# Patient Record
Sex: FEMALE | Race: WHITE | Employment: FULL TIME | URBAN - METROPOLITAN AREA
[De-identification: names, ages, dates, MRNs, and addresses within clinical notes are randomized per-mention and may not be internally consistent; named-entity substitution may affect disease eponyms.]

---

## 2018-03-25 ENCOUNTER — APPOINTMENT (OUTPATIENT)
Dept: GENERAL RADIOLOGY | Facility: CLINIC | Age: 53
End: 2018-03-25
Attending: EMERGENCY MEDICINE
Payer: COMMERCIAL

## 2018-03-25 ENCOUNTER — HOSPITAL ENCOUNTER (EMERGENCY)
Facility: CLINIC | Age: 53
Discharge: HOME | End: 2018-03-25
Attending: EMERGENCY MEDICINE | Admitting: EMERGENCY MEDICINE
Payer: COMMERCIAL

## 2018-03-25 VITALS
DIASTOLIC BLOOD PRESSURE: 71 MMHG | HEART RATE: 79 BPM | HEIGHT: 71 IN | SYSTOLIC BLOOD PRESSURE: 131 MMHG | RESPIRATION RATE: 16 BRPM | WEIGHT: 155 LBS | TEMPERATURE: 98.2 F | BODY MASS INDEX: 21.7 KG/M2 | OXYGEN SATURATION: 100 %

## 2018-03-25 DIAGNOSIS — S93.491A SPRAIN OF ANTERIOR TALOFIBULAR LIGAMENT OF RIGHT ANKLE, INITIAL ENCOUNTER: ICD-10-CM

## 2018-03-25 PROCEDURE — 99283 EMERGENCY DEPT VISIT LOW MDM: CPT

## 2018-03-25 PROCEDURE — 73630 X-RAY EXAM OF FOOT: CPT | Mod: RT

## 2018-03-25 PROCEDURE — 73610 X-RAY EXAM OF ANKLE: CPT | Mod: RT

## 2018-03-25 ASSESSMENT — ENCOUNTER SYMPTOMS: ARTHRALGIAS: 1

## 2018-03-25 NOTE — DISCHARGE INSTRUCTIONS
Treating Ankle Sprains  Treatment will depend on how bad your sprain is. For a severe sprain, healing may take 3 months or more.  Right after your injury: Use R.I.C.E.    Rest: At first, keep weight off the ankle as much as you can. You may be given crutches to help you walk without putting weight on the ankle.    Ice: Put an ice pack on the ankle for 15 minutes. Remove the pack and wait at least 30 minutes. Repeat for up to 3 days. This helps reduce swelling.    Compression: To reduce swelling and keep the joint stable, you may need to wrap the ankle with an elastic bandage. For more severe sprains, you may need an ankle brace or a cast.    Elevation: To reduce swelling, keep your ankle raised above your heart when you sit or lie down.  Medicine  Your healthcare provider may suggest oral non-steroidal anti-inflammatory medicine (NSAIDs), such as ibuprofen. This relieves the pain and helps reduce any swelling. Be sure to take your medicine as directed.  Contrast baths  After 3 days, soak your ankle in warm water for 30 seconds, then in cool water for 30 seconds. Go back and forth for 5 minutes. Doing this every 2 hours will help keep the swelling down.  Exercises    After about 2 to 3 weeks, you may be given exercises to strengthen the ligaments and muscles in the ankle. Doing these exercises will help prevent another ankle sprain. Exercises may include standing on your toes and then on your heels and doing ankle curls.  Ankle curls    Sit on the edge of a sturdy table or lie on your back.    Pull your toes toward you. Then point them away from you. Repeat for 2 to 3 minutes.   Date Last Reviewed: 9/28/2015 2000-2017 [employer]. [address] [place] [address], [place] [address]. All rights reserved. This information is not intended as a substitute for professional medical care. Always follow your healthcare professional's instructions.

## 2018-03-25 NOTE — ED AVS SNAPSHOT
Emergency Department    [address] [place] [address]    Phone:  [phone]    Fax:  [phone]                                       Felix Alvarado   MRN: [id number]    Department:   Emergency Department   Date of Visit:  3/25/2018           After Visit Summary Signature Page     I have received my discharge instructions, and my questions have been answered. I have discussed any challenges I see with this plan with the nurse or doctor.    ..........................................................................................................................................  Patient/Patient Representative Signature      ..........................................................................................................................................  Patient Representative Print Name and Relationship to Patient    ..................................................               ................................................  Date                                            Time    ..........................................................................................................................................  Reviewed by Signature/Title    ...................................................              ..............................................  Date                                                            Time

## 2018-03-25 NOTE — ED PROVIDER NOTES
"  History     Chief Complaint:  Ankle Pain    The history is provided by the patient.      Felix Alvarado is a 52 year old female who presents to the emergency department today for evaluation of right ankle pain. The patient reports she was walking down some stairs today when she slipped, and rolled her right ankle. Since, she's had 5/10 pain to the ankle extending into her right foot that increases with pressure on her ankle bone and when she moves her toes. She reports some mild tingling in her toes but no loss of sensation. The patient denies any other injuries.    Allergies:  Valium [Diazepam]        Medications:    The patient is currently on no regular medications.     Past Medical History:    Thyroid disease     Past Surgical History:    History reviewed. No pertinent surgical history.    Family History:    History reviewed. No pertinent family history.      Social History:  The patient was accompanied to the ED by  and daughter.  Smoking Status: Never smoker  Smokeless Tobacco: Negative  Alcohol Use: Positive    Review of Systems   Musculoskeletal: Positive for arthralgias (right ankle pain).   All other systems reviewed and are negative.    Physical Exam     Patient Vitals for the past 24 hrs:   BP Temp Temp src Pulse Resp SpO2 Height Weight   03/25/18 1356 131/71 98.2  F (36.8  C) Oral 79 16 100 % 1.803 m (5' 11\") 70.3 kg (155 lb)      Physical Exam  General: Patient in mild distress.  Alert and cooperative with exam. Normal mentation  HEENT: NC/AT. Conjunctiva without injection or scleral icterus. External ears normal.  Respiratory: Breathing comfortably on room air  CV: Normal rate, all extremities well perfused  GI:  Non-distended abdomen  Skin: Warm, dry, no rashes/open wounds on exposed skin  Musculoskeletal: RLE: CMS intact, tenderness to palpation over the distal fibula and over the ATF ligament with mild associated swelling and tenderness to palpation. Hip and knee exam normal. " Anterior/posterioir drawer test of ankle normal.  Neuro: Alert, answers questions appropriately. No gross motor deficits    Emergency Department Course     Imaging:  Radiology findings were communicated with the patient who voiced understanding of the findings.    Ankle XR, G/E 3 views, right   Final Result   IMPRESSION: No evidence of acute fracture or malalignment. The ankle   mortise is intact.      SABAS GOODWIN MD      Foot  XR, G/E 3 views, right   Final Result   IMPRESSION:  No acute osseous abnormality demonstrated.      HENRIQUE JENSEN MD            Emergency Department Course:    1352 The patient was sent for a XR while in the emergency department, results above.      1356 Nursing notes and vitals reviewed.    1432 I performed an exam of the patient as documented above.     1440 I personally reviewed the imaging results with the patient and answered all related questions prior to discharge.    Impression & Plan      Medical Decision Making:  Felix Alvarado is a 52 year old female who presents for evaluation of ankle pain. Signs and symptoms are consistent with an ankle sprain. This involves ligaments of the lateral ankle by clinical exam. No signs of septic arthritis, gout, pseudogout, fracture, cellulitis, etc. There are no signs of fracture. The patients neurovascular status is normal. A head to toe trauma exam is otherwise negative; the likelihood of other serious sequelae of trauma (spine, head, chest, abdomen, other extremities, pelvis) is low. Plan is for protected weightbearing, RICE treatment. Patient will advance weightbearing and follow-up with primary PRN in 10-14 days.     Diagnosis:    ICD-10-CM    1. Sprain of anterior talofibular ligament of right ankle, initial encounter S93.491A      Disposition:   Discharge     Scribe Disclosure:  David YANG, am serving as a scribe at 2:30 PM on 3/25/2018 to document services personally performed by Leander Tang DO based on my observations  and the provider's statements to me.      EMERGENCY DEPARTMENT       Leander Tang,   03/25/18 2050

## 2018-03-25 NOTE — ED AVS SNAPSHOT
Emergency Department    [address] [place] [address]    Phone:  [phone]    Fax:  [phone]                                       Felix Alvarado   MRN: [id number]    Department:   Emergency Department   Date of Visit:  3/25/2018           Patient Information     Date Of Birth          1965        Your diagnoses for this visit were:     Sprain of anterior talofibular ligament of right ankle, initial encounter        You were seen by Leander Tang DO.      Follow-up Information     Follow up with Primary care doctor In 10 days.    Why:  As needed        Discharge Instructions         Treating Ankle Sprains  Treatment will depend on how bad your sprain is. For a severe sprain, healing may take 3 months or more.  Right after your injury: Use R.I.C.E.    Rest: At first, keep weight off the ankle as much as you can. You may be given crutches to help you walk without putting weight on the ankle.    Ice: Put an ice pack on the ankle for 15 minutes. Remove the pack and wait at least 30 minutes. Repeat for up to 3 days. This helps reduce swelling.    Compression: To reduce swelling and keep the joint stable, you may need to wrap the ankle with an elastic bandage. For more severe sprains, you may need an ankle brace or a cast.    Elevation: To reduce swelling, keep your ankle raised above your heart when you sit or lie down.  Medicine  Your healthcare provider may suggest oral non-steroidal anti-inflammatory medicine (NSAIDs), such as ibuprofen. This relieves the pain and helps reduce any swelling. Be sure to take your medicine as directed.  Contrast baths  After 3 days, soak your ankle in warm water for 30 seconds, then in cool water for 30 seconds. Go back and forth for 5 minutes. Doing this every 2 hours will help keep the swelling down.  Exercises    After about 2 to 3 weeks, you may be given exercises to strengthen the ligaments and muscles in the ankle. Doing these  exercises will help prevent another ankle sprain. Exercises may include standing on your toes and then on your heels and doing ankle curls.  Ankle curls    Sit on the edge of a sturdy table or lie on your back.    Pull your toes toward you. Then point them away from you. Repeat for 2 to 3 minutes.   Date Last Reviewed: 9/28/2015 2000-2017 [employer]. [address] [place] [address], [place] [address]. All rights reserved. This information is not intended as a substitute for professional medical care. Always follow your healthcare professional's instructions.          24 Hour Appointment Hotline       To make an appointment at any [facility], call [phone] ([phone]). If you don't have a family doctor or clinic, we will help you find one. [place] clinics are conveniently located to serve the needs of you and your family.             Review of your medicines      Notice     You have not been prescribed any medications.            Procedures and tests performed during your visit     Ankle XR, G/E 3 views, right    Foot  XR, G/E 3 views, right      Orders Needing Specimen Collection     None      Pending Results     No orders found from 3/23/2018 to 3/26/2018.            Pending Culture Results     No orders found from 3/23/2018 to 3/26/2018.            Pending Results Instructions     If you had any lab results that were not finalized at the time of your Discharge, you can call the ED Lab Result RN at [phone]. You will be contacted by this team for any positive Lab results or changes in treatment. The nurses are available 7 days a week from 10A to 6:30P.  You can leave a message 24 hours per day and they will return your call.        Test Results From Your Hospital Stay        3/25/2018  2:13 PM      Narrative     FOOT THREE VIEWS RIGHT  3/25/2018 2:07 PM     HISTORY: Rolled ankle.     COMPARISON: None.    FINDINGS: There is no significant degenerative change. The Lisfranc  joint appears  unremarkable in these views. The plantar arch is  unremarkable in these views. There is no acute fracture or  dislocation. There are no worrisome bony lesions.         Impression     IMPRESSION:  No acute osseous abnormality demonstrated.    HENRIQUE JENSEN MD         3/25/2018  3:05 PM      Narrative     XR ANKLE RT G/E 3 VW 3/25/2018 2:57 PM    HISTORY: Pain.    COMPARISON: None.        Impression     IMPRESSION: No evidence of acute fracture or malalignment. The ankle  mortise is intact.    SABAS GOODWIN MD                Clinical Quality Measure: Blood Pressure Screening     Your blood pressure was checked while you were in the emergency department today. The last reading we obtained was  BP: 131/71 . Please read the guidelines below about what these numbers mean and what you should do about them.  If your systolic blood pressure (the top number) is less than 120 and your diastolic blood pressure (the bottom number) is less than 80, then your blood pressure is normal. There is nothing more that you need to do about it.  If your systolic blood pressure (the top number) is 120-139 or your diastolic blood pressure (the bottom number) is 80-89, your blood pressure may be higher than it should be. You should have your blood pressure rechecked within a year by a primary care provider.  If your systolic blood pressure (the top number) is 140 or greater or your diastolic blood pressure (the bottom number) is 90 or greater, you may have high blood pressure. High blood pressure is treatable, but if left untreated over time it can put you at risk for heart attack, stroke, or kidney failure. You should have your blood pressure rechecked by a primary care provider within the next 4 weeks.  If your provider in the emergency department today gave you specific instructions to follow-up with your doctor or provider even sooner than that, you should follow that instruction and not wait for up to 4 weeks for your follow-up visit.    "     Thank you for choosing [facility]       Thank you for choosing [facility] for your care. Our goal is always to provide you with excellent care. Hearing back from our patients is one way we can continue to improve our services. Please take a few minutes to complete the written survey that you may receive in the mail after you visit with us. Thank you!        [employer] Information     [employer] lets you send messages to your doctor, view your test results, renew your prescriptions, schedule appointments and more. To sign up, go to [email or web address] . Click on \"Log in\" on the left side of the screen, which will take you to the Welcome page. Then click on \"Sign up Now\" on the right side of the page.     You will be asked to enter the access code listed below, as well as some personal information. Please follow the directions to create your username and password.     Your access code is: [email or web address]  Expires: 2018  3:22 PM     Your access code will  in 90 days. If you need help or a new code, please call your [facility] clinic or [phone].        Care EveryWhere ID     This is your Care EveryWhere ID. This could be used by other organizations to access your [facility] medical records  [id number]        Equal Access to Services     TARA CABRERA : Davida sinhao Danie, waaxda luqadaha, qaybta kaalmada adefredyyada, anya sandoval. So [facility] [phone].    ATENCIÓN: Si habla español, tiene a tenorio disposición servicios gratuitos de asistencia lingüística. Llame al [phone].    We comply with applicable federal civil rights laws and [place] laws. We do not discriminate on the basis of race, color, national origin, age, disability, sex, sexual orientation, or gender identity.            After Visit Summary       This is your record. Keep this with you and show to your community pharmacist(s) and doctor(s) at your next visit.                  "

## 2018-04-28 ENCOUNTER — TRANSFERRED RECORDS (OUTPATIENT)
Dept: HEALTH INFORMATION MANAGEMENT | Facility: CLINIC | Age: 53
End: 2018-04-28

## 2018-12-12 ENCOUNTER — TELEPHONE (OUTPATIENT)
Dept: ONCOLOGY | Facility: CLINIC | Age: 53
End: 2018-12-12

## 2018-12-12 NOTE — TELEPHONE ENCOUNTER
ONCOLOGY INTAKE: Records Information      APPT INFORMATION: 12/13/18 at 2:35PM  Referring provider:  Self Referred  Referring provider s clinic:  N/A  Reason for visit/diagnosis:  Lower Pelvic Pain, Surgical Consult for Hysterectomy    Were the records received with the referral (via [employer])? No    Has patient been seen for any external appt for this diagnosis (enter clinic/location)? Yes - [facility] and [facility] by Dr. Sada Singh.    FOR MED RECS TEAM  MACKENZIE for both facilities need to be emailed to the pt. She will complete them and return for us to request for medical records and imaging. Per the pt, she has not had any biopsies performed.

## 2018-12-13 ENCOUNTER — PRE VISIT (OUTPATIENT)
Dept: ONCOLOGY | Facility: CLINIC | Age: 53
End: 2018-12-13

## 2018-12-13 ENCOUNTER — ONCOLOGY VISIT (OUTPATIENT)
Dept: ONCOLOGY | Facility: CLINIC | Age: 53
End: 2018-12-13
Attending: OBSTETRICS & GYNECOLOGY
Payer: COMMERCIAL

## 2018-12-13 VITALS
WEIGHT: 178 LBS | TEMPERATURE: 97.6 F | HEIGHT: 69 IN | RESPIRATION RATE: 16 BRPM | BODY MASS INDEX: 26.36 KG/M2 | OXYGEN SATURATION: 94 % | SYSTOLIC BLOOD PRESSURE: 143 MMHG | DIASTOLIC BLOOD PRESSURE: 88 MMHG | HEART RATE: 107 BPM

## 2018-12-13 DIAGNOSIS — I34.1 MITRAL VALVE PROLAPSE: ICD-10-CM

## 2018-12-13 DIAGNOSIS — Z12.4 PAP SMEAR FOR CERVICAL CANCER SCREENING: Primary | ICD-10-CM

## 2018-12-13 DIAGNOSIS — D25.9 FIBROID UTERUS: ICD-10-CM

## 2018-12-13 DIAGNOSIS — R10.2 PELVIC PAIN IN FEMALE: ICD-10-CM

## 2018-12-13 DIAGNOSIS — N80.9 ENDOMETRIOSIS: ICD-10-CM

## 2018-12-13 DIAGNOSIS — Z12.31 VISIT FOR SCREENING MAMMOGRAM: ICD-10-CM

## 2018-12-13 PROCEDURE — 99204 OFFICE O/P NEW MOD 45 MIN: CPT | Mod: ZP | Performed by: OBSTETRICS & GYNECOLOGY

## 2018-12-13 PROCEDURE — G0463 HOSPITAL OUTPT CLINIC VISIT: HCPCS | Mod: ZF

## 2018-12-13 PROCEDURE — G0145 SCR C/V CYTO,THINLAYER,RESCR: HCPCS | Performed by: OBSTETRICS & GYNECOLOGY

## 2018-12-13 RX ORDER — LIOTHYRONINE SODIUM 25 UG/1
TABLET ORAL DAILY
Refills: 0 | COMMUNITY
Start: 2018-12-05

## 2018-12-13 RX ORDER — LEVOTHYROXINE SODIUM 125 MCG
TABLET ORAL DAILY
Refills: 2 | COMMUNITY
Start: 2018-10-14

## 2018-12-13 ASSESSMENT — MIFFLIN-ST. JEOR: SCORE: 1470.74

## 2018-12-13 ASSESSMENT — PAIN SCALES - GENERAL: PAINLEVEL: NO PAIN (0)

## 2018-12-13 NOTE — PROGRESS NOTES
Consult Notes on Referred Patient    Date: 2018     Dr. David Krueger MD  No address on file     RE: Felix Alvarado  : 1965  HERMAN: 2018     Felix Alvarado is a very pleasant 53 year old woman with a history of pelvic pain, uterine fibroids and endometriosis.  Given these findings she was subsequently presents to [facility] for new patient consultation. 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    Brief History:  Felix presents to clinic to discuss history of pelvic pain, endometriosis and fibroids. She had her daughter through 5 years of IVF treatment. She was diagnosed with endometriosis prior to having her daughter. She had IVF done at [facility]. She also was told she had fibroids which she knew prior to starting IVF, firoids were invading myometrium. After having her daughter, she never had another period. She was 43 when her daughter was born. Due to history of fibroids, endometriosis and being close to menopause at that time, she had a plan to have a hysterectomy. Unfortunately she was then diagnosed with a periesophageal hernia and had that repaired which pushed off her hysterectomy.   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   She has abdominal pain now, she cannot tell if it is due to her IBS or more localized to the pelvis. She was also having yearly transvaginal ultrasounds. She has abdominal discomfort in her RLQ. She also reports a recent weight gain of 12-15 pounds.     Family Hx: 2 aunts had breast cancer (one was 37 and  at 39, other was 58)- father will not be tested for BRCA. Younger sister is BRCA negative.     Health maintenance: Due for pap. Mammogram 2 years ago. Never had colonoscopy.Needs referral to PCP and cardiology.     Review of Systems:  Systemic           no weight changes; no fever; no chills; no night sweats; no appetite changes  Skin           no rashes, or lesions  Eye            no irritation; no changes in vision  Keo-Laryngeal           no dysphagia; no hoarseness   Pulmonary    no cough; no shortness of breath  Cardiovascular    no chest pain; no palpitations  Gastrointestinal    no diarrhea; no constipation; + abdominal pain; no changes in bowel  habits; no blood in stool  Genitourinary   no urinary frequency; no urinary urgency; no dysuria; no pain; no abnormal vaginal discharge; no abnormal vaginal bleeding  Breast   no breast discharge; no breast changes; no breast pain  Musculoskeletal    no myalgias; no arthralgias; no back pain  Psychiatric           no depressed mood; no anxiety    Hematologic           no tender lymph nodes; no noticeable swellings or lumps   Endocrine    no hot flashes; no heat/cold intolerance         Neurological   no tremor; no numbness and tingling; no headaches; no difficulty  sleeping    I have reviewed and addressed the patient's review of symptoms for today's visit.     Past Medical History:  Past Medical History:   Diagnosis Date     Connective tissue disorder (H)     MASS     GERD (gastroesophageal reflux disease)      Hypothyroid      Mitral valve prolapse      Mitral valve regurgitation      Thyroid disease        Past Surgical History:  LAPAROSCOPY        ORTHOPEDIC SURGERY          SECTION 09       ESOPHAGOGASTRIC FUNDOPLICATION         UPPER GASTROINTESTINAL ENDOSCOPY        Health Maintenance:  Health Maintenance Due   Topic Date Due     DTAP/TDAP/TD IMMUNIZATION (1 - Tdap) 1970     PHQ-2 Q1 YR  1975     HIV SCREEN (SYSTEM ASSIGNED)  1981     HEPATITIS C SCREENING  1981     PAP SCREENING Q3 YR (SYSTEM ASSIGNED)  1984     MAMMO SCREEN Q2 YR (SYSTEM ASSIGNED)  2003     LIPID SCREEN Q5 YR FEMALE (SYSTEM ASSIGNED)  2008     COLON CANCER SCREEN (SYSTEM ASSIGNED)  2013     ZOSTER IMMUNIZATION (1 of 2) 2013     ADVANCE DIRECTIVE PLANNING Q5 YRS  2018     INFLUENZA VACCINE  "(1) 09/01/2018       Last Pap Smear: recent and normal per patient report.   She has not had a history of abnormal Pap smears.    Last Mammogram: 2 years ago per pt report    Last Colonoscopy: Never done                    Current Medications:   currently has no medications in their medication list.     Allergies:   Allergies   Allergen Reactions     Valium [Diazepam]       Social History:  Social History     Tobacco Use     Smoking status: Never Smoker   Substance Use Topics     Alcohol use: Yes     History   Drug Use No     Family History:   The patient's family history is notable for:  Family History   Problem Relation Age of Onset     Prostate Cancer Father      Breast Cancer Paternal Aunt 37     Breast Cancer Paternal Aunt 58       Physical Exam:   /88   Pulse 107   Temp 97.6  F (36.4  C) (Oral)   Resp 16   Ht 1.759 m (5' 9.25\")   Wt 80.7 kg (178 lb)   LMP  (LMP Unknown)   SpO2 94%   BMI 26.10 kg/m    Body mass index is 26.1 kg/m .    General Appearance: healthy and alert, no distress     HEENT:  no thyromegaly, no palpable nodules or masses        Cardiovascular: regular rate and rhythm, no gallops, rubs or murmurs     Respiratory: lungs clear, no rales, rhonchi or wheezes, normal diaphragmatic excursion    Musculoskeletal: extremities non tender and without edema    Skin: no lesions or rashes     Neurological: normal gait, no gross defects     Psychiatric: appropriate mood and affect                               Hematological: normal cervical, supraclavicular and inguinal lymph nodes     Gastrointestinal:       abdomen soft, non-tender, non-distended, no organomegaly or masses    Genitourinary: External genitalia and urethral meatus appears normal.  Vagina is smooth without nodularity or masses.  Small Cervix appears nulliparous and without lesions.  Bimanual exam reveal no masses, nodularity or fullness.  Recto-vaginal exam confirms these findings. Pap done today.      Assessment:    Felix" Refugio Alvarado is a 53 year old woman who presents today for a surgical consult for a hysterectomy due to lower pelvic pain, history of endometriosis and fibroids.     Plan:    1.)   Will plan for transvaginal US and return visit in the beginning of January. Will discuss hysterectomy at that time and finalize plan. Reviewed signs and symptoms for when she should contact the clinic or seek additional care. Patient to contact the clinic with any questions or concerns in the interim.     2.) Genetic risk factors were assessed and the patient does not meet the qualifications for a referral.      3.) Labs and/or tests ordered include:  Pelvic US. Mammogram. Pap     4.) Health maintenance issues addressed today include: patient is due for mammogram and colonoscopy. She would like to do fecal occult prior to doing colonoscopy.     5.) Will refer patient to PCP at [facility] to establish care - Dr. Alvarez. Will also refer patient to cardiology for follow up regarding mitral valve prolapse.     Thank you for allowing us to participate in the care of your patient.       Sincerely,    Rosaura Sosa MD    Department of Ob/Gyn and Women's Health  Division of Gynecologic Oncology  [facility]  [phone]      Patient Care Team:  No Ref-Primary, Physician as PCP - General  SELF, REFERRED    I, Jose Martins, am serving as a scribe to document services personally performed by Rosaura Sosa MD, based upon my observations and the provider's statements to me. All documentation has been reviewed by the aforementioned doctor prior to being entered into the official medical record.     I have reviewed the above note and agree with the scribe's notation as written.

## 2018-12-13 NOTE — TELEPHONE ENCOUNTER
RECORDS STATUS - ALL OTHER DIAGNOSIS      RECORDS RECEIVED FROM:    DATE RECEIVED:    NOTES STATUS DETAILS   OFFICE NOTE from referring provider Requested [facility]   OFFICE NOTE from medical oncologist     DISCHARGE SUMMARY from hospital     DISCHARGE REPORT from the ER     OPERATIVE REPORT     MEDICATION LIST     CLINICAL TRIAL TREATMENTS TO DATE     LABS     PATHOLOGY REPORTS     ANYTHING RELATED TO DIAGNOSIS     GENONOMIC TESTING     TYPE:     IMAGING (NEED IMAGES & REPORT)     CT SCANS     MRI     MAMMO     ULTRASOUND     PET

## 2018-12-13 NOTE — LETTER
2018       RE: Felix Alvarado  [address]  [facility] [address]     Dear Colleague,    Thank you for referring your patient, Felix Alvarado, to the [facility]. Please see a copy of my visit note below.                            Consult Notes on Referred Patient    Date: 2018     Dr. David Krueger MD  No address on file     RE: Felix Alvarado  : 1965  HERMAN: 2018     Felix Alvarado is a very pleasant 53 year old woman with a history of pelvic pain, uterine fibroids and endometriosis.  Given these findings she was subsequently presents to the Gynecologic Cancer Clinic for new patient consultation. 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    Brief History:  Felix presents to clinic to discuss history of pelvic pain, endometriosis and fibroids. She had her daughter through 5 years of IVF treatment. She was diagnosed with endometriosis prior to having her daughter. She had IVF done at [facility]. She also was told she had fibroids which she knew prior to starting IVF, firoids were invading myometrium. After having her daughter, she never had another period. She was 43 when her daughter was born. Due to history of fibroids, endometriosis and being close to menopause at that time, she had a plan to have a hysterectomy. Unfortunately she was then diagnosed with a periesophageal hernia and had that repaired which pushed off her hysterectomy.   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   She has abdominal pain now, she cannot tell if it is due to her IBS or more localized to the pelvis. She was also having yearly transvaginal ultrasounds. She has abdominal discomfort in her RLQ. She also reports a recent weight gain of 12-15 pounds.     Family Hx: 2 aunts had breast cancer (one was 37 and  at 39, other was 58)- father will not be tested for BRCA. Younger sister is BRCA negative.     Health maintenance: Due for pap.  Mammogram 2 years ago. Never had colonoscopy.Needs referral to PCP and cardiology.     Review of Systems:  Systemic           no weight changes; no fever; no chills; no night sweats; no appetite changes  Skin           no rashes, or lesions  Eye           no irritation; no changes in vision  Keo-Laryngeal           no dysphagia; no hoarseness   Pulmonary    no cough; no shortness of breath  Cardiovascular    no chest pain; no palpitations  Gastrointestinal    no diarrhea; no constipation; + abdominal pain; no changes in bowel  habits; no blood in stool  Genitourinary   no urinary frequency; no urinary urgency; no dysuria; no pain; no abnormal vaginal discharge; no abnormal vaginal bleeding  Breast   no breast discharge; no breast changes; no breast pain  Musculoskeletal    no myalgias; no arthralgias; no back pain  Psychiatric           no depressed mood; no anxiety    Hematologic           no tender lymph nodes; no noticeable swellings or lumps   Endocrine    no hot flashes; no heat/cold intolerance         Neurological   no tremor; no numbness and tingling; no headaches; no difficulty  sleeping    I have reviewed and addressed the patient's review of symptoms for today's visit.     Past Medical History:  Past Medical History:   Diagnosis Date     Connective tissue disorder (H)     MASS     GERD (gastroesophageal reflux disease)      Hypothyroid      Mitral valve prolapse      Mitral valve regurgitation      Thyroid disease        Past Surgical History:  LAPAROSCOPY        ORTHOPEDIC SURGERY          SECTION 09       ESOPHAGOGASTRIC FUNDOPLICATION         UPPER GASTROINTESTINAL ENDOSCOPY        Health Maintenance:  Health Maintenance Due   Topic Date Due     DTAP/TDAP/TD IMMUNIZATION (1 - Tdap) 1970     PHQ-2 Q1 YR  1975     HIV SCREEN (SYSTEM ASSIGNED)  1981     HEPATITIS C SCREENING  1981     PAP SCREENING Q3 YR (SYSTEM ASSIGNED)  1984     MAMMO SCREEN Q2 YR  "(SYSTEM ASSIGNED)  07/27/2003     LIPID SCREEN Q5 YR FEMALE (SYSTEM ASSIGNED)  07/27/2008     COLON CANCER SCREEN (SYSTEM ASSIGNED)  07/27/2013     ZOSTER IMMUNIZATION (1 of 2) 07/27/2013     ADVANCE DIRECTIVE PLANNING Q5 YRS  07/27/2018     INFLUENZA VACCINE (1) 09/01/2018       Last Pap Smear: recent and normal per patient report.   She has not had a history of abnormal Pap smears.    Last Mammogram: 2 years ago per pt report    Last Colonoscopy: Never done                    Current Medications:   currently has no medications in their medication list.     Allergies:   Allergies   Allergen Reactions     Valium [Diazepam]       Social History:  Social History     Tobacco Use     Smoking status: Never Smoker   Substance Use Topics     Alcohol use: Yes     History   Drug Use No     Family History:   The patient's family history is notable for:  Family History   Problem Relation Age of Onset     Prostate Cancer Father      Breast Cancer Paternal Aunt 37     Breast Cancer Paternal Aunt 58       Physical Exam:   /88   Pulse 107   Temp 97.6  F (36.4  C) (Oral)   Resp 16   Ht 1.759 m (5' 9.25\")   Wt 80.7 kg (178 lb)   LMP  (LMP Unknown)   SpO2 94%   BMI 26.10 kg/m     Body mass index is 26.1 kg/m .    General Appearance: healthy and alert, no distress     HEENT:  no thyromegaly, no palpable nodules or masses        Cardiovascular: regular rate and rhythm, no gallops, rubs or murmurs     Respiratory: lungs clear, no rales, rhonchi or wheezes, normal diaphragmatic excursion    Musculoskeletal: extremities non tender and without edema    Skin: no lesions or rashes     Neurological: normal gait, no gross defects     Psychiatric: appropriate mood and affect                               Hematological: normal cervical, supraclavicular and inguinal lymph nodes     Gastrointestinal:       abdomen soft, non-tender, non-distended, no organomegaly or masses    Genitourinary: External genitalia and urethral meatus " appears normal.  Vagina is smooth without nodularity or masses.  Small Cervix appears nulliparous and without lesions.  Bimanual exam reveal no masses, nodularity or fullness.  Recto-vaginal exam confirms these findings. Pap done today.      Assessment:    Felix Alvarado is a 53 year old woman who presents today for a surgical consult for a hysterectomy due to lower pelvic pain, history of endometriosis and fibroids.     Plan:    1.)   Will plan for transvaginal US and return visit in the beginning of January. Will discuss hysterectomy at that time and finalize plan. Reviewed signs and symptoms for when she should contact the clinic or seek additional care. Patient to contact the clinic with any questions or concerns in the interim.     2.) Genetic risk factors were assessed and the patient does not meet the qualifications for a referral.      3.) Labs and/or tests ordered include:  Pelvic US. Mammogram. Pap     4.) Health maintenance issues addressed today include: patient is due for mammogram and colonoscopy. She would like to do fecal occult prior to doing colonoscopy.     5.) Will refer patient to PCP at [facility] to establish care - Dr. Alvarez. Will also refer patient to cardiology for follow up regarding mitral valve prolapse.     Thank you for allowing us to participate in the care of your patient.       Sincerely,    Rosaura Sosa MD    Department of Ob/Gyn and Women's Health  Division of Gynecologic Oncology  [facility]  [phone]    CC  Patient Care Team:  No Ref-Primary, Physician as PCP - General  SELF, REFERRED    I, Jose Martins, am serving as a scribe to document services personally performed by Rosaura Sosa MD, based upon my observations and the provider's statements to me. All documentation has been reviewed by the aforementioned doctor prior to being entered into the official medical record.     I have reviewed the above  note and agree with the scribe's notation as written.      Again, thank you for allowing me to participate in the care of your patient.      Sincerely,    Rosaura Sosa MD

## 2018-12-13 NOTE — NURSING NOTE
"Oncology Rooming Note    December 13, 2018 3:00 PM   Felix Alvarado is a 53 year old female who presents for:    Chief Complaint   Patient presents with     Oncology Clinic Visit     New patient; Pelvic Pain     Initial Vitals: /88   Pulse 107   Temp 97.6  F (36.4  C) (Oral)   Resp 16   Ht 1.759 m (5' 9.25\")   Wt 80.7 kg (178 lb)   LMP  (LMP Unknown)   SpO2 94%   BMI 26.10 kg/m   Estimated body mass index is 26.1 kg/m  as calculated from the following:    Height as of this encounter: 1.759 m (5' 9.25\").    Weight as of this encounter: 80.7 kg (178 lb). Body surface area is 1.99 meters squared.  No Pain (0) Comment: Data Unavailable   No LMP recorded (lmp unknown).  Allergies reviewed: Yes  Medications reviewed: Yes    Medications: Medication refills not needed today.  Pharmacy name entered into EPIC: Data Unavailable    Clinical concerns: Pelvic pain     6 minutes for nursing intake (face to face time)     Monica Dale CMA              "

## 2018-12-13 NOTE — LETTER
Date:December 31, 2018      Patient was self referred, no letter generated. Do not send.        [facility] Information

## 2018-12-18 LAB
COPATH REPORT: NORMAL
PAP: NORMAL

## 2018-12-19 ENCOUNTER — PATIENT OUTREACH (OUTPATIENT)
Dept: CARE COORDINATION | Facility: CLINIC | Age: 53
End: 2018-12-19

## 2018-12-26 NOTE — TELEPHONE ENCOUNTER
FUTURE VISIT INFORMATION:    Date: 12/31/18    Time: 1430    Location: [facility]  REFERRAL INFORMATION:    Referring provider:      Referring providers clinic:  [facility]    Reason for visit/diagnosis:  Mitral valve prolapse  All records in epic.

## 2018-12-27 ENCOUNTER — TELEPHONE (OUTPATIENT)
Dept: FAMILY MEDICINE | Facility: CLINIC | Age: 53
End: 2018-12-27

## 2018-12-27 NOTE — TELEPHONE ENCOUNTER
Patient was called twice to assist in scheduling with NP Silvina Stephenson per below messages.     Pt was given the scheduling line for [facility], and is aware of this plan.         ----- Message from Hortencia Alvarez sent at 12/21/2018  5:52 PM CST -----  Regarding: FW: [facility] Check Out Note 12/13  Call pt around 10am my time to schedule  ----- Message -----  From: Hortencia Alvarez  Sent: 12/21/2018  To: Hortencia Alvarez  Subject: FW: [facility] Check Out Note 12/13           Call pt back   ----- Message -----  From: Ann James MD  Sent: 12/17/2018  10:02 AM  To: Hortencia Alvarez  Subject: RE: [facility] Check Out Note 12/13           What might work is for her to see Silvina Stephenson DNP that works with me to get established then I can see her in May when I return.   [phone] [clinic appointment]  Ann James MD  ----- Message -----  From: Hortencia Alvarez  Sent: 12/13/2018   4:45 PM  To: Rosaura Sosa MD, Ann James MD, #  Subject: [facility] Check Out Note 12/13               Hi Dr. Sosa and Dr. James,     Dr. Sosa is referring Felix to you, Dr. James, and the patient is staff here.     She states that she'd like to see Dr. James prior to next available in April 2019. Dr. James is on sabbatical I'm being told from January to April.     If she can't see Dr. James, she would like a referral and recommendation to an alternate provider that can see her at [facility] for her care.     I will call once it is decided how to proceed with scheduling. Please advise.     Thank you,   Milla MATHEW   Clinic Coordinator [facility]yn Onc  [facility]   [phone]

## 2018-12-31 ENCOUNTER — PRE VISIT (OUTPATIENT)
Dept: CARDIOLOGY | Facility: OTHER | Age: 53
End: 2018-12-31

## 2019-01-10 ENCOUNTER — ANCILLARY PROCEDURE (OUTPATIENT)
Dept: MAMMOGRAPHY | Facility: CLINIC | Age: 54
End: 2019-01-10
Attending: OBSTETRICS & GYNECOLOGY
Payer: COMMERCIAL

## 2019-01-10 ENCOUNTER — ANCILLARY PROCEDURE (OUTPATIENT)
Dept: ULTRASOUND IMAGING | Facility: CLINIC | Age: 54
End: 2019-01-10
Attending: OBSTETRICS & GYNECOLOGY
Payer: COMMERCIAL

## 2019-01-10 DIAGNOSIS — R10.2 PELVIC PAIN IN FEMALE: ICD-10-CM

## 2019-01-10 DIAGNOSIS — Z12.31 VISIT FOR SCREENING MAMMOGRAM: ICD-10-CM

## 2019-01-10 DIAGNOSIS — N80.9 ENDOMETRIOSIS: ICD-10-CM

## 2019-01-10 DIAGNOSIS — D25.9 FIBROID UTERUS: ICD-10-CM

## 2019-01-19 ENCOUNTER — APPOINTMENT (OUTPATIENT)
Dept: GENERAL RADIOLOGY | Facility: CLINIC | Age: 54
End: 2019-01-19
Payer: COMMERCIAL

## 2019-01-19 ENCOUNTER — HOSPITAL ENCOUNTER (EMERGENCY)
Facility: CLINIC | Age: 54
Discharge: HOME | End: 2019-01-19
Attending: EMERGENCY MEDICINE | Admitting: EMERGENCY MEDICINE
Payer: COMMERCIAL

## 2019-01-19 VITALS
DIASTOLIC BLOOD PRESSURE: 82 MMHG | BODY MASS INDEX: 24.34 KG/M2 | WEIGHT: 170 LBS | TEMPERATURE: 98 F | OXYGEN SATURATION: 98 % | SYSTOLIC BLOOD PRESSURE: 143 MMHG | HEIGHT: 70 IN | RESPIRATION RATE: 18 BRPM

## 2019-01-19 DIAGNOSIS — S20.212A BRUISED RIB, LEFT, INITIAL ENCOUNTER: ICD-10-CM

## 2019-01-19 DIAGNOSIS — S92.152A CLOSED DISPLACED AVULSION FRACTURE OF LEFT TALUS, INITIAL ENCOUNTER: ICD-10-CM

## 2019-01-19 DIAGNOSIS — S92.352A CLOSED DISPLACED FRACTURE OF FIFTH METATARSAL BONE OF LEFT FOOT, INITIAL ENCOUNTER: ICD-10-CM

## 2019-01-19 PROCEDURE — 73590 X-RAY EXAM OF LOWER LEG: CPT | Mod: LT

## 2019-01-19 PROCEDURE — 99285 EMERGENCY DEPT VISIT HI MDM: CPT

## 2019-01-19 PROCEDURE — 73630 X-RAY EXAM OF FOOT: CPT | Mod: LT

## 2019-01-19 PROCEDURE — 25000131 ZZH RX MED GY IP 250 OP 636 PS 637: Performed by: EMERGENCY MEDICINE

## 2019-01-19 PROCEDURE — 73610 X-RAY EXAM OF ANKLE: CPT | Mod: LT

## 2019-01-19 PROCEDURE — 71101 X-RAY EXAM UNILAT RIBS/CHEST: CPT | Mod: LT

## 2019-01-19 PROCEDURE — 25000132 ZZH RX MED GY IP 250 OP 250 PS 637: Performed by: EMERGENCY MEDICINE

## 2019-01-19 RX ORDER — ONDANSETRON 4 MG/1
4 TABLET, ORALLY DISINTEGRATING ORAL ONCE
Status: COMPLETED | OUTPATIENT
Start: 2019-01-19 | End: 2019-01-19

## 2019-01-19 RX ORDER — ACETAMINOPHEN 500 MG
1000 TABLET ORAL ONCE
Status: COMPLETED | OUTPATIENT
Start: 2019-01-19 | End: 2019-01-19

## 2019-01-19 RX ORDER — OXYCODONE HYDROCHLORIDE 5 MG/1
5-10 TABLET ORAL EVERY 6 HOURS PRN
Qty: 12 TABLET | Refills: 0 | Status: SHIPPED | OUTPATIENT
Start: 2019-01-19 | End: 2019-04-05

## 2019-01-19 RX ORDER — OXYCODONE HYDROCHLORIDE 5 MG/1
5 TABLET ORAL ONCE
Status: COMPLETED | OUTPATIENT
Start: 2019-01-19 | End: 2019-01-19

## 2019-01-19 RX ORDER — IBUPROFEN 600 MG/1
600 TABLET, FILM COATED ORAL ONCE
Status: COMPLETED | OUTPATIENT
Start: 2019-01-19 | End: 2019-01-19

## 2019-01-19 RX ORDER — ONDANSETRON 4 MG/1
4-8 TABLET, ORALLY DISINTEGRATING ORAL EVERY 8 HOURS PRN
Qty: 12 TABLET | Refills: 0 | Status: SHIPPED | OUTPATIENT
Start: 2019-01-19 | End: 2019-04-05

## 2019-01-19 RX ADMIN — ACETAMINOPHEN 1000 MG: 500 TABLET, FILM COATED ORAL at 19:36

## 2019-01-19 RX ADMIN — ONDANSETRON 4 MG: 4 TABLET, ORALLY DISINTEGRATING ORAL at 20:10

## 2019-01-19 RX ADMIN — OXYCODONE HYDROCHLORIDE 5 MG: 5 TABLET ORAL at 20:10

## 2019-01-19 RX ADMIN — IBUPROFEN 600 MG: 600 TABLET ORAL at 19:36

## 2019-01-19 ASSESSMENT — ENCOUNTER SYMPTOMS: ARTHRALGIAS: 1

## 2019-01-19 ASSESSMENT — MIFFLIN-ST. JEOR: SCORE: 1446.36

## 2019-01-19 NOTE — ED AVS SNAPSHOT
Emergency Department  [address] [place] [id number]  Phone:  [phone]  Fax:  [phone]                                    Felix Alvarado   MRN: [id number]    Department:   Emergency Department   Date of Visit:  1/19/2019           After Visit Summary Signature Page    I have received my discharge instructions, and my questions have been answered. I have discussed any challenges I see with this plan with the nurse or doctor.    ..........................................................................................................................................  Patient/Patient Representative Signature      ..........................................................................................................................................  Patient Representative Print Name and Relationship to Patient    ..................................................               ................................................  Date                                   Time    ..........................................................................................................................................  Reviewed by Signature/Title    ...................................................              ..............................................  Date                                               Time          22EPIC Rev 08/18
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
## 2019-01-20 NOTE — DISCHARGE INSTRUCTIONS
Prescription strength dosing instructions:  - 600mg ibuprofen (Advil, Motrin) every 6 hours as needed for fever/pain/inflammation.  Naprosyn (Naproxen, Aleve) works similarly and can be used instead, if preferred.  - 650mg acetaminophen (tylenol) every 4 hours or 1000mg every 6 hours (maximum of 4000mg in 24 hours).  Acetaminophen is often in combination over the counter and prescription pain medications.  Be sure to include this in daily total.    These medications work differently and can be used in combination.      Opioid Medication Information    You have been given a prescription for an opioid (narcotic) pain medicine and/or have received a pain medicine while here in the Emergency Department. These medicines can make you drowsy or impaired. You must not drive, operate dangerous equipment, or engage in any other dangerous activities while taking these medications. If you drive while taking these medications, you could be arrested for driving under the influence (DUI). Do not drink any alcohol while you are taking these medications.     Opioid pain medications can cause addiction. If you have a history of chemical dependency of any type, you are at a higher risk of becoming addicted to pain medications.  Only take these prescribed medications to treat your pain when all other options have been tried. Take it for as short a time and as few doses as possible. Store your pain pills in a secure place, as they are frequently stolen and provide a dangerous opportunity for children or visitors in your house to start abusing these powerful medications. We will not replace any lost or stolen medicine.    If you do not finish your medication, it is a good idea to get rid of it but please do not flush it down the toilet. Please dispose of the remaining medication at a local pharmacy or law enforcement facility. The [place] Pollution [employer] has additional information on medication disposal:  https://www.pca.[facility].[place]/living-green/managing-unwanted-medications.      Many prescription pain medications contain Tylenol  (acetaminophen), including Vicodin , Tylenol #3 , Norco , Lortab , and Percocet .  You should not take any extra pills of Tylenol  if you are using these prescription medications or you can get very sick.  Do not ever take more than 3000 mg of acetaminophen in any 24 hour period.    All opioids tend to cause constipation. Drink plenty of water and eat foods that have a lot of fiber, such as fruits, vegetables, prune juice, apple juice and high fiber cereal.  Take a laxative if you don?t move your bowels at least every other day. Miralax , Milk of Magnesia, Colace , or Senna  can be used to keep you regular.

## 2019-01-20 NOTE — ED NOTES
Spoke with patient about options. She stated that she would like to go home. Will assist her into wheelchair for discharge. Requested to get 1 dose of her Zofran and Oxycodone now since her pharmacy is closed. Will talk to Dr. Bruce.

## 2019-01-20 NOTE — ED NOTES
ED Tech assisted putting cam boot on patient. Educated on use. Patient c/o pain while boot was being put on. Educated on resting, elevate, and icing. Pt verbalized understanding. Clarified with patient about crutches. She stated that she did not want crutches. States that her and her  will go to a store and look for a scooter to set her leg in it. Attempted to have patient stand with 2 person assist. Unable to bare weight on L foot. Reported to Dr. Bruce.

## 2019-01-29 ENCOUNTER — TELEPHONE (OUTPATIENT)
Dept: ONCOLOGY | Facility: CLINIC | Age: 54
End: 2019-01-29

## 2019-01-30 DIAGNOSIS — N84.0 ENDOMETRIAL POLYP: Primary | ICD-10-CM

## 2019-01-31 ENCOUNTER — HOSPITAL ENCOUNTER (OUTPATIENT)
Facility: AMBULATORY SURGERY CENTER | Age: 54
End: 2019-01-31
Attending: OBSTETRICS & GYNECOLOGY | Admitting: OBSTETRICS & GYNECOLOGY
Payer: COMMERCIAL

## 2019-02-05 NOTE — TELEPHONE ENCOUNTER
Collected: 12/13/2018   CYTOLOGIC INTERPRETATION:   ther: Negative for Intraepithelial Lesion or Malignancy, See   interpretation/Result.        Endometrial cells present in a woman 45 years of age or older    -Endometrial cells in women 45 years of age or older may be associated   with benign endometrium, hormonal   alterations, and, less commonly, endometrial or uterine abnormalities.   -Endometrial evaluation is recommended in postmenopausal women.     MD reviewed needs endometrial biopsy    12/24 left message on pt voice mail to call back

## 2019-02-05 NOTE — TELEPHONE ENCOUNTER
EXAMINATION: US PELVIC COMPLETE wITH TRANSVAGINAL, 1/10/2019 1:41 PM                                                     IMPRESSION:   1. Single small uterine fibroid.  2. 1.3 cm endometrial polyp in the uterine fundal region. Malignancy  is not entirely excluded in this 53 old female patient. Patient is  already been seen by gynecology oncology service and may benefit from  hysteroscopic evaluation.  3. There are no sonographic signs of invasive endometriosis, noting  ultrasound is limited for this diagnosis as compared to MRI.  4. Suggestion of uterine adenomyosis.    MD reviewed pt needs hysteroscopy Humphrey JAFFE    1/29 spoke with pt discussed ultrasound as well as previous pap smear results   Explained the further need to evaluate the uterine lining to make sure there are no abnormal cells   Pt recently broke ankle to recovering from that would like to wait a few weeks til she is more mobile to schedule procedure    Surgery scheduled on 1/25 at [facility]  Reviewed preop information with pt   Sent via email pre op packet  Will call pt to discuss any questions    Post op visit  scheduled

## 2019-02-19 ENCOUNTER — HOSPITAL ENCOUNTER (OUTPATIENT)
Facility: AMBULATORY SURGERY CENTER | Age: 54
End: 2019-02-19
Attending: OBSTETRICS & GYNECOLOGY
Payer: COMMERCIAL

## 2019-02-19 ENCOUNTER — ANESTHESIA EVENT (OUTPATIENT)
Dept: SURGERY | Facility: AMBULATORY SURGERY CENTER | Age: 54
End: 2019-02-19

## 2019-02-25 ENCOUNTER — ANESTHESIA (OUTPATIENT)
Dept: SURGERY | Facility: AMBULATORY SURGERY CENTER | Age: 54
End: 2019-02-25

## 2019-04-07 ENCOUNTER — ANESTHESIA EVENT (OUTPATIENT)
Dept: SURGERY | Facility: CLINIC | Age: 54
End: 2019-04-07
Payer: COMMERCIAL

## 2019-04-07 NOTE — ANESTHESIA PREPROCEDURE EVALUATION
Anesthesia Pre-Procedure Evaluation    Patient: Felix Alvarado   MRN:     [id number] Gender:   female   Age:    53 year old :      1965        Preoperative Diagnosis: Endometrial Polyp   Procedure(s):  Dilation And Curettage Under Ultrasound Guidance  Operative Hysteroscopy With Myosure     Past Medical History:   Diagnosis Date     Connective tissue disorder (H)     MASS     GERD (gastroesophageal reflux disease)      Hypothyroid      Mitral valve prolapse      Mitral valve regurgitation      PONV (postoperative nausea and vomiting)      Thyroid disease       Past Surgical History:   Procedure Laterality Date     GYN SURGERY       HERNIA REPAIR       ORTHOPEDIC SURGERY            Anesthesia Evaluation     . Pt has had prior anesthetic. Type: General and MAC    History of anesthetic complications   - PONV        ROS/MED HX    ENT/Pulmonary:  - neg pulmonary ROS     Neurologic:  - neg neurologic ROS     Cardiovascular:     (+) ----. : . . . :. valvular problems/murmurs type: MVP and MR . Previous cardiac testing date:results:date: results:ECG reviewed date:19 results:Sinus tachycardia 102   with occasional Premature ventricular complexes  Minimal voltage criteria for LVH, may be normal variant  Borderline ECG   date: results:          METS/Exercise Tolerance:  >4 METS   Hematologic:  - neg hematologic  ROS       Musculoskeletal:   (+) fracture lower extremity (Left): Ankle, -       GI/Hepatic:     (+) GERD Asymptomatic on medication,       Renal/Genitourinary:  - ROS Renal section negative       Endo:     (+) thyroid problem hypothyroidism, .      Psychiatric:  - neg psychiatric ROS       Infectious Disease:  - neg infectious disease ROS       Malignancy:      - no malignancy   Other:    - neg other ROS                     PHYSICAL EXAM:   Mental Status/Neuro: A/A/O   Airway: Facies: Feasible  Mallampati: I  Mouth/Opening: Full  TM distance: > 6 cm  Neck ROM: Full   Respiratory: Auscultation: CTAB    "  Resp. Rate: Normal     Resp. Effort: Normal      CV: Rhythm: Regular  Rate: Tachy  Heart: Normal Sounds   Comments:      Dental: Normal                  No results found for: WBC, HGB, HCT, PLT, CRP, SED, NA, POTASSIUM, CHLORIDE, CO2, BUN, CR, GLC, CAROLYN, PHOS, MAG, ALBUMIN, PROTTOTAL, ALT, AST, GGT, ALKPHOS, BILITOTAL, BILIDIRECT, LIPASE, AMYLASE, RAVINDRA, PTT, INR, FIBR, TSH, T4, T3, HCG, HCGS, CKTOTAL, CKMB, TROPN    Preop Vitals  BP Readings from Last 3 Encounters:   01/19/19 143/82   12/13/18 143/88   03/25/18 131/71    Pulse Readings from Last 3 Encounters:   12/13/18 107   03/25/18 79      Resp Readings from Last 3 Encounters:   01/19/19 18   12/13/18 16   03/25/18 16    SpO2 Readings from Last 3 Encounters:   01/19/19 98%   12/13/18 94%   03/25/18 100%      Temp Readings from Last 1 Encounters:   01/19/19 36.7  C (98  F) (Oral)    Ht Readings from Last 1 Encounters:   01/19/19 1.778 m (5' 10\")      Wt Readings from Last 1 Encounters:   01/19/19 77.1 kg (170 lb)    Estimated body mass index is 24.39 kg/m  as calculated from the following:    Height as of 1/19/19: 1.778 m (5' 10\").    Weight as of 1/19/19: 77.1 kg (170 lb).     LDA:            Assessment:   ASA SCORE: 2       Documentation: H&P complete; Preop Testing complete; Consents complete   Proceeding: Proceed without further delay  Tobacco Use:  NO Active use of Tobacco/UNKNOWN Tobacco use status     Plan:   Anes. Type:  General   Pre-Induction: neurontin, kytril, tylenol, ultram.   Induction:  IV (Standard)   Airway: LMA   Access/Monitoring: PIV   Maintenance: Balanced (minimize narcotics)   Emergence: Procedure Site   Logistics: Same Day Surgery     Postop Pain/Sedation Strategy:  Standard-Options: Opioids PRN     PONV Management:  Adult Risk Factors: Female, H/o PONV or Motion Sickness, Non-Smoker, Postop Opioids  PONV Prevention Adult: neurontin.     CONSENT: Direct conversation   Plan and risks discussed with: Patient   Blood Products: Consent " Deferred (Minimal Blood Loss)                         Jazmine Paul MD

## 2019-04-08 ENCOUNTER — SURGERY (OUTPATIENT)
Age: 54
End: 2019-04-08
Payer: COMMERCIAL

## 2019-04-08 ENCOUNTER — HOSPITAL ENCOUNTER (OUTPATIENT)
Facility: CLINIC | Age: 54
Discharge: HOME | End: 2019-04-08
Attending: OBSTETRICS & GYNECOLOGY | Admitting: OBSTETRICS & GYNECOLOGY
Payer: COMMERCIAL

## 2019-04-08 ENCOUNTER — ANESTHESIA (OUTPATIENT)
Dept: SURGERY | Facility: CLINIC | Age: 54
End: 2019-04-08
Payer: COMMERCIAL

## 2019-04-08 VITALS
TEMPERATURE: 98.2 F | HEIGHT: 70 IN | WEIGHT: 184.3 LBS | HEART RATE: 97 BPM | OXYGEN SATURATION: 96 % | SYSTOLIC BLOOD PRESSURE: 139 MMHG | RESPIRATION RATE: 16 BRPM | BODY MASS INDEX: 26.39 KG/M2 | DIASTOLIC BLOOD PRESSURE: 93 MMHG

## 2019-04-08 LAB — GLUCOSE BLDC GLUCOMTR-MCNC: 110 MG/DL (ref 70–99)

## 2019-04-08 PROCEDURE — 25000132 ZZH RX MED GY IP 250 OP 250 PS 637: Performed by: ANESTHESIOLOGY

## 2019-04-08 PROCEDURE — 27210794 ZZH OR GENERAL SUPPLY STERILE: Performed by: OBSTETRICS & GYNECOLOGY

## 2019-04-08 PROCEDURE — 25800030 ZZH RX IP 258 OP 636: Performed by: ANESTHESIOLOGY

## 2019-04-08 PROCEDURE — 36000057 ZZH SURGERY LEVEL 3 1ST 30 MIN - UMMC: Performed by: OBSTETRICS & GYNECOLOGY

## 2019-04-08 PROCEDURE — 93010 ELECTROCARDIOGRAM REPORT: CPT | Performed by: INTERNAL MEDICINE

## 2019-04-08 PROCEDURE — 37000009 ZZH ANESTHESIA TECHNICAL FEE, EACH ADDTL 15 MIN: Performed by: OBSTETRICS & GYNECOLOGY

## 2019-04-08 PROCEDURE — 36000059 ZZH SURGERY LEVEL 3 EA 15 ADDTL MIN UMMC: Performed by: OBSTETRICS & GYNECOLOGY

## 2019-04-08 PROCEDURE — 25000128 H RX IP 250 OP 636: Performed by: NURSE ANESTHETIST, CERTIFIED REGISTERED

## 2019-04-08 PROCEDURE — 88305 TISSUE EXAM BY PATHOLOGIST: CPT | Performed by: OBSTETRICS & GYNECOLOGY

## 2019-04-08 PROCEDURE — 25000128 H RX IP 250 OP 636: Performed by: NURSE PRACTITIONER

## 2019-04-08 PROCEDURE — 58120 DILATION AND CURETTAGE: CPT | Mod: GC | Performed by: OBSTETRICS & GYNECOLOGY

## 2019-04-08 PROCEDURE — 71000014 ZZH RECOVERY PHASE 1 LEVEL 2 FIRST HR: Performed by: OBSTETRICS & GYNECOLOGY

## 2019-04-08 PROCEDURE — 40000065 ZZH STATISTIC EKG NON-CHARGEABLE

## 2019-04-08 PROCEDURE — 40000170 ZZH STATISTIC PRE-PROCEDURE ASSESSMENT II: Performed by: OBSTETRICS & GYNECOLOGY

## 2019-04-08 PROCEDURE — 37000008 ZZH ANESTHESIA TECHNICAL FEE, 1ST 30 MIN: Performed by: OBSTETRICS & GYNECOLOGY

## 2019-04-08 PROCEDURE — 25000566 ZZH SEVOFLURANE, EA 15 MIN: Performed by: OBSTETRICS & GYNECOLOGY

## 2019-04-08 PROCEDURE — 82962 GLUCOSE BLOOD TEST: CPT

## 2019-04-08 PROCEDURE — 25000125 ZZHC RX 250: Performed by: NURSE ANESTHETIST, CERTIFIED REGISTERED

## 2019-04-08 PROCEDURE — 71000027 ZZH RECOVERY PHASE 2 EACH 15 MINS: Performed by: OBSTETRICS & GYNECOLOGY

## 2019-04-08 RX ORDER — METOPROLOL TARTRATE 1 MG/ML
1-2 INJECTION, SOLUTION INTRAVENOUS EVERY 5 MIN PRN
Status: DISCONTINUED | OUTPATIENT
Start: 2019-04-08 | End: 2019-04-08 | Stop reason: HOSPADM

## 2019-04-08 RX ORDER — SODIUM CHLORIDE, SODIUM LACTATE, POTASSIUM CHLORIDE, CALCIUM CHLORIDE 600; 310; 30; 20 MG/100ML; MG/100ML; MG/100ML; MG/100ML
INJECTION, SOLUTION INTRAVENOUS CONTINUOUS
Status: DISCONTINUED | OUTPATIENT
Start: 2019-04-08 | End: 2019-04-08 | Stop reason: HOSPADM

## 2019-04-08 RX ORDER — ONDANSETRON 4 MG/1
4 TABLET, ORALLY DISINTEGRATING ORAL EVERY 30 MIN PRN
Status: DISCONTINUED | OUTPATIENT
Start: 2019-04-08 | End: 2019-04-08 | Stop reason: HOSPADM

## 2019-04-08 RX ORDER — FENTANYL CITRATE 50 UG/ML
25-50 INJECTION, SOLUTION INTRAMUSCULAR; INTRAVENOUS
Status: DISCONTINUED | OUTPATIENT
Start: 2019-04-08 | End: 2019-04-08 | Stop reason: HOSPADM

## 2019-04-08 RX ORDER — MEPERIDINE HYDROCHLORIDE 25 MG/ML
12.5 INJECTION INTRAMUSCULAR; INTRAVENOUS; SUBCUTANEOUS
Status: DISCONTINUED | OUTPATIENT
Start: 2019-04-08 | End: 2019-04-08 | Stop reason: HOSPADM

## 2019-04-08 RX ORDER — DEXAMETHASONE SODIUM PHOSPHATE 4 MG/ML
INJECTION, SOLUTION INTRA-ARTICULAR; INTRALESIONAL; INTRAMUSCULAR; INTRAVENOUS; SOFT TISSUE PRN
Status: DISCONTINUED | OUTPATIENT
Start: 2019-04-08 | End: 2019-04-08

## 2019-04-08 RX ORDER — HYDROCODONE BITARTRATE AND ACETAMINOPHEN 5; 325 MG/1; MG/1
1 TABLET ORAL
Status: DISCONTINUED | OUTPATIENT
Start: 2019-04-08 | End: 2019-04-08 | Stop reason: HOSPADM

## 2019-04-08 RX ORDER — ESMOLOL HYDROCHLORIDE 10 MG/ML
INJECTION INTRAVENOUS PRN
Status: DISCONTINUED | OUTPATIENT
Start: 2019-04-08 | End: 2019-04-08

## 2019-04-08 RX ORDER — ACETAMINOPHEN 325 MG/1
975 TABLET ORAL
Status: COMPLETED | OUTPATIENT
Start: 2019-04-08 | End: 2019-04-08

## 2019-04-08 RX ORDER — PROPOFOL 10 MG/ML
INJECTION, EMULSION INTRAVENOUS PRN
Status: DISCONTINUED | OUTPATIENT
Start: 2019-04-08 | End: 2019-04-08

## 2019-04-08 RX ORDER — DIMENHYDRINATE 50 MG/ML
25 INJECTION, SOLUTION INTRAMUSCULAR; INTRAVENOUS
Status: DISCONTINUED | OUTPATIENT
Start: 2019-04-08 | End: 2019-04-08 | Stop reason: HOSPADM

## 2019-04-08 RX ORDER — TRAMADOL HYDROCHLORIDE 50 MG/1
50 TABLET ORAL
Status: COMPLETED | OUTPATIENT
Start: 2019-04-08 | End: 2019-04-08

## 2019-04-08 RX ORDER — LIDOCAINE HYDROCHLORIDE 20 MG/ML
INJECTION, SOLUTION INFILTRATION; PERINEURAL PRN
Status: DISCONTINUED | OUTPATIENT
Start: 2019-04-08 | End: 2019-04-08

## 2019-04-08 RX ORDER — HYDROMORPHONE HYDROCHLORIDE 1 MG/ML
.3-.5 INJECTION, SOLUTION INTRAMUSCULAR; INTRAVENOUS; SUBCUTANEOUS EVERY 10 MIN PRN
Status: DISCONTINUED | OUTPATIENT
Start: 2019-04-08 | End: 2019-04-08 | Stop reason: HOSPADM

## 2019-04-08 RX ORDER — IBUPROFEN 600 MG/1
600 TABLET, FILM COATED ORAL
Status: DISCONTINUED | OUTPATIENT
Start: 2019-04-08 | End: 2019-04-08 | Stop reason: HOSPADM

## 2019-04-08 RX ORDER — KETOROLAC TROMETHAMINE 30 MG/ML
15 INJECTION, SOLUTION INTRAMUSCULAR; INTRAVENOUS ONCE
Status: COMPLETED | OUTPATIENT
Start: 2019-04-08 | End: 2019-04-08

## 2019-04-08 RX ORDER — FENTANYL CITRATE 50 UG/ML
INJECTION, SOLUTION INTRAMUSCULAR; INTRAVENOUS PRN
Status: DISCONTINUED | OUTPATIENT
Start: 2019-04-08 | End: 2019-04-08

## 2019-04-08 RX ORDER — NALOXONE HYDROCHLORIDE 0.4 MG/ML
.1-.4 INJECTION, SOLUTION INTRAMUSCULAR; INTRAVENOUS; SUBCUTANEOUS
Status: DISCONTINUED | OUTPATIENT
Start: 2019-04-08 | End: 2019-04-08 | Stop reason: HOSPADM

## 2019-04-08 RX ORDER — ALBUTEROL SULFATE 0.83 MG/ML
2.5 SOLUTION RESPIRATORY (INHALATION) EVERY 4 HOURS PRN
Status: DISCONTINUED | OUTPATIENT
Start: 2019-04-08 | End: 2019-04-08 | Stop reason: HOSPADM

## 2019-04-08 RX ORDER — GRANISETRON HYDROCHLORIDE 1 MG/1
1 TABLET, FILM COATED ORAL
Status: COMPLETED | OUTPATIENT
Start: 2019-04-08 | End: 2019-04-08

## 2019-04-08 RX ORDER — ONDANSETRON 2 MG/ML
4 INJECTION INTRAMUSCULAR; INTRAVENOUS EVERY 30 MIN PRN
Status: DISCONTINUED | OUTPATIENT
Start: 2019-04-08 | End: 2019-04-08 | Stop reason: HOSPADM

## 2019-04-08 RX ORDER — HYDRALAZINE HYDROCHLORIDE 20 MG/ML
2.5-5 INJECTION INTRAMUSCULAR; INTRAVENOUS EVERY 10 MIN PRN
Status: DISCONTINUED | OUTPATIENT
Start: 2019-04-08 | End: 2019-04-08 | Stop reason: HOSPADM

## 2019-04-08 RX ORDER — ACETAMINOPHEN 325 MG/1
975 TABLET ORAL ONCE
Status: DISCONTINUED | OUTPATIENT
Start: 2019-04-08 | End: 2019-04-08 | Stop reason: HOSPADM

## 2019-04-08 RX ORDER — LIDOCAINE 40 MG/G
CREAM TOPICAL
Status: DISCONTINUED | OUTPATIENT
Start: 2019-04-08 | End: 2019-04-08 | Stop reason: HOSPADM

## 2019-04-08 RX ADMIN — DEXAMETHASONE SODIUM PHOSPHATE 6 MG: 4 INJECTION, SOLUTION INTRA-ARTICULAR; INTRALESIONAL; INTRAMUSCULAR; INTRAVENOUS; SOFT TISSUE at 13:34

## 2019-04-08 RX ADMIN — SUGAMMADEX 200 MG: 100 INJECTION, SOLUTION INTRAVENOUS at 14:11

## 2019-04-08 RX ADMIN — FENTANYL CITRATE 100 MCG: 50 INJECTION, SOLUTION INTRAMUSCULAR; INTRAVENOUS at 13:29

## 2019-04-08 RX ADMIN — MIDAZOLAM 2 MG: 1 INJECTION INTRAMUSCULAR; INTRAVENOUS at 13:15

## 2019-04-08 RX ADMIN — PROPOFOL 160 MG: 10 INJECTION, EMULSION INTRAVENOUS at 13:29

## 2019-04-08 RX ADMIN — LIDOCAINE HYDROCHLORIDE 100 MG: 20 INJECTION, SOLUTION INFILTRATION; PERINEURAL at 13:29

## 2019-04-08 RX ADMIN — ESMOLOL HYDROCHLORIDE 20 MG: 10 INJECTION, SOLUTION INTRAVENOUS at 14:15

## 2019-04-08 RX ADMIN — GRANISETRON HYDROCHLORIDE 1 MG: 1 TABLET, FILM COATED ORAL at 10:47

## 2019-04-08 RX ADMIN — TRAMADOL HYDROCHLORIDE 50 MG: 50 TABLET, COATED ORAL at 10:47

## 2019-04-08 RX ADMIN — ACETAMINOPHEN 975 MG: 325 TABLET, FILM COATED ORAL at 10:46

## 2019-04-08 RX ADMIN — ROCURONIUM BROMIDE 50 MG: 10 INJECTION INTRAVENOUS at 13:29

## 2019-04-08 RX ADMIN — SODIUM CHLORIDE, POTASSIUM CHLORIDE, SODIUM LACTATE AND CALCIUM CHLORIDE: 600; 310; 30; 20 INJECTION, SOLUTION INTRAVENOUS at 13:00

## 2019-04-08 RX ADMIN — GABAPENTIN 400 MG: 300 CAPSULE ORAL at 10:47

## 2019-04-08 RX ADMIN — ESMOLOL HYDROCHLORIDE 20 MG: 10 INJECTION, SOLUTION INTRAVENOUS at 13:35

## 2019-04-08 RX ADMIN — KETOROLAC TROMETHAMINE 15 MG: 30 INJECTION INTRAMUSCULAR; INTRAVENOUS at 14:48

## 2019-04-08 ASSESSMENT — PAIN DESCRIPTION - DESCRIPTORS
DESCRIPTORS: CRAMPING

## 2019-04-08 ASSESSMENT — MIFFLIN-ST. JEOR: SCORE: 1506.25

## 2019-04-08 NOTE — BRIEF OP NOTE
[facility] Brief Operative Note    Pre-operative diagnosis: Endometrial Polyp; endometrial cells on pap   Post-operative diagnosis same   Procedure: EUA, D and C hysteroscopy with myosure, ECC   Surgeon(s): MD Sheila   Estimated blood loss: 20 cc   Specimens: ID Type Source Tests Collected by Time Destination   A : Endocervical [place] Tissue Endocervical SURGICAL PATHOLOGY EXAM Rosaura Sosa MD 4/8/2019  2:03 PM    B : Endometrial Polyp Tissue Endocervical SURGICAL PATHOLOGY EXAM Rosaura Sosa MD 4/8/2019  2:09 PM       Findings: 1.5 cm polyp seen within the endometrial canal at fundus. Normal ostia visualized after procedure. No other lesions noted.    Anesthesia: GET  Complications: None    Rosaura Sosa MD    Department of Ob/Gyn and Women's Health  Division of Gynecologic Oncology  [facility]  [phone]

## 2019-04-08 NOTE — DISCHARGE INSTRUCTIONS
Take it easy when you get home.  Remember, same day surgery DOES NOT MEAN SAME DAY RECOVERY!  Healing is a gradual process.  You will need some time to recover - you may be more tired than you realize at first.  Rest and relax for at least the first 24 hours at home.  You'll feel better and heal faster if you take good care of yourself.    [facility], [place]  Same-Day Surgery   Adult Discharge Orders & Instructions     For 24 hours after surgery    1. Get plenty of rest.  A responsible adult must stay with you for at least 24 hours after you leave the hospital.   2. Do not drive or use heavy equipment.  If you have weakness or tingling, don't drive or use heavy equipment until this feeling goes away.  3. Do not drink alcohol.  4. Avoid strenuous or risky activities.  Ask for help when climbing stairs.   5. You may feel lightheaded.  IF so, sit for a few minutes before standing.  Have someone help you get up.   6. If you have nausea (feel sick to your stomach): Drink only clear liquids such as apple juice, ginger ale, broth or 7-Up.  Rest may also help.  Be sure to drink enough fluids.  Move to a regular diet as you feel able.  7. You may have a slight fever. Call the doctor if your fever is over 100 F (37.7 C) (taken under the tongue) or lasts longer than 24 hours.  8. You may have a dry mouth, a sore throat, muscle aches or trouble sleeping.  These should go away after 24 hours.  9. Do not make important or legal decisions.   Call your doctor for any of the followin.  Signs of infection (fever, growing tenderness at the surgery site, a large amount of drainage or bleeding, severe pain, foul-smelling drainage, redness, swelling).    2. It has been over 8 to 10 hours since surgery and you are still not able to urinate (pass water).    3.  Headache for over 24 hours.      To contact a doctor, call Dr Sosa's office at [phone]-- Gynecology-Oncology or:        [phone] and  ask for the resident on call for  Gynecology-Oncology (answered 24 hours a day)      Emergency Department:    [facility]: [phone]       (TTY for hearing impaired: [phone])

## 2019-04-08 NOTE — OP NOTE
Procedure Date: 04/08/2019      IDENTIFICATION:  The patient is a 53-year-old  female who presented to me originally in December with history of pelvic pain and uterine fibroids, as well as endometriosis.  At that point, she had a history of requiring multiple cycles of IVF, and she was 43 years old her daughter was born.  Ultimately, she desired a hysterectomy; however, on ultrasound she was found to have a 1.3 cm endometrial polyp at the uterine fundus and she was found to have endometrial cells on her Pap smear.  Based on this, it was recommended she undergo D and C, hysteroscopy with removal of uterine polyp to rule out carcinoma.  The risks of the procedure including risk of infection, bleeding, damage to surrounding organs were explained in detail and informed consent was obtained.  She was taken to the operating room on 04/08/2019.      PREOPERATIVE DIAGNOSIS:  Endometrial polyp, endometrial cells on Pap smear.      POSTOPERATIVE DIAGNOSIS:  Endometrial polyp, endometrial cells on Pap smear.      PROCEDURE:     1.  Exam under anesthesia.   2.  D and C hysteroscopy with MyoSure.     3.  ECC.      SURGEON:  Rosaura Sosa MD      ESTIMATED BLOOD LOSS:  20 mL.      SPECIMENS:     1.  Endocervical curettings.     2.  Endocervical tissue with polyp.      FINDINGS:  On exam under anesthesia, normal Bartholin, urethral, and [place] glands.  Vaginal mucosa appeared normal.  Cervix was normal.  On hysteroscopy, there was a 1.5 cm polyp seen within the endometrial canal at the fundus.  Normal ostia were visualized after the procedure, no other lesions were noted.  The uterus sounded to approximately 8 cm.      ANESTHESIA:  General endotracheal.      COMPLICATIONS:  None.      PROCEDURE IN DETAIL:  The patient was taken to the operating room with IV fluids running where she was placed in supine position and administered general endotracheal anesthesia without difficulty.  She was then placed in the dorsal  lithotomy position with her legs in Jairo stirrups.  She was prepped and draped in the usual sterile fashion.  An exam under anesthesia was performed with findings as noted above.        A sterile speculum was placed in the vagina.  The cervix was identified and grasped with a single-tooth tenaculum.  The cervix was then gently dilated to allow for placement of a uterine sound, and the uterus was sounded to 8 cm.  The cervix was sequentially dilated to approximately a size 6 to allow for the hysteroscope to be inserted without difficulty.  At this point, the hysteroscope was gently inserted with normal saline running.  Upon entry into the uterus I was able to visualize the polyp immediately at the fundus, with the stalk from the posterior aspect.  The MyoSure instrument was then introduced through the hysteroscope and activated to completely remove the polyp from the posterior aspect of the uterus at the level of the fundus.  All tissue was collected.  Following this, I was able to identify the ostia on both sides.  There were no other lesions seen.  The fluid was removed from the uterus.  The hysteroscope was then removed in its entirety and endocervical curettage of the endocervical canal was performed.  This was sent separately.  The single-tooth tenaculum was removed from the cervix and no bleeding was noted.  The patient was then taken out of the dorsal lithotomy position and awakened from general endotracheal anesthesia without difficulty and taken to PACU.      The patient received IV fluids of 500 mL.  There was a 410 mL deficit.  Total saline used was 1740.      URINE OUTPUT:  Not measured during the procedure.         MD Rosaura GERARDO MD    Department of Ob/Gyn and Women's Health  Division of Gynecologic Oncology  [facility]  [phone]    I was scrubbed and present for the entire procedure.       D: 04/08/2019   T: 04/08/2019    MT: WT      Name:     HOA GOMEZ   MRN:      [id number]        Account:        [id number]   :      1965           Procedure Date: 2019      Document: [id number]

## 2019-04-08 NOTE — PROGRESS NOTES
Gynecologic Oncology Postoperative Check Note  4/8/2019    S: Patient feeling well post op. No N/V, CP, SOB, or dizziness. Tolerating PO intake. Voiding without difficulty. Minimal vaginal bleeding.    O:  Vitals:    04/08/19 1515 04/08/19 1530 04/08/19 1545 04/08/19 1550   BP: 142/88 143/81 141/82 149/86   BP Location:    Left arm   Pulse:  79  86   Resp: 16 16 16 16   Temp: 97.6  F (36.4  C)   98.1  F (36.7  C)   TempSrc: Oral   Oral   SpO2: 97% 97% 97% 99%   Weight:       Height:       O2 RA     Gen: Alert and oriented. Family in room  Cardio: HR regular  Resp: LS clear  Abdomen: Soft, nontender to palpation    A/P: 53 year old POD#0 s/p EUA, D&C hysteroscopy and ECC.     Dz: Endometrial polyp, endometrial cells on PAP  Dispo: Patient feeling ready for discharge. Discussed when to call the clinic. She will follow up as scheduled or sooner if needed.     Paula Monroy, CNP  4/8/2019 4:21 PM

## 2019-04-08 NOTE — ANESTHESIA CARE TRANSFER NOTE
Patient: Felix Alvarado    Procedure(s):  Dilation And Curettage Under Ultrasound Guidance  Operative Hysteroscopy With Myosure    Diagnosis: Endometrial Polyp  Diagnosis Additional Information: No value filed.    Anesthesia Type:   No value filed.     Note:  Airway :Face Mask  Patient transferred to:PACU  Comments: VSS. Report to RN. Patient arousable.Handoff Report: Identifed the Patient, Identified the Reponsible Provider, Reviewed the pertinent medical history, Discussed the surgical course, Reviewed Intra-OP anesthesia mangement and issues during anesthesia, Set expectations for post-procedure period and Allowed opportunity for questions and acknowledgement of understanding      Vitals: (Last set prior to Anesthesia Care Transfer)    CRNA VITALS  4/8/2019 1350 - 4/8/2019 1425      4/8/2019             NIBP:  152/86    Pulse:  100    SpO2:  100 %                Electronically Signed By: IESHA Garrison CRNA  April 8, 2019  2:25 PM

## 2019-04-08 NOTE — H&P
Felix Alvarado is a very pleasant 53 year old woman with a history of pelvic pain, uterine fibroids and endometriosis.  Given these findings she was subsequently presents to the [facility] for new patient consultation. 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 Presents today for D and C hysteroscopy under US guidance.     Brief History:  Felix presents to clinic to discuss history of pelvic pain, endometriosis and fibroids. She had her daughter through 5 years of IVF treatment. She was diagnosed with endometriosis prior to having her daughter. She had IVF done at [facility]. She also was told she had fibroids which she knew prior to starting IVF, firoids were invading myometrium. After having her daughter, she never had another period. She was 43 when her daughter was born. Due to history of fibroids, endometriosis and being close to menopause at that time, she had a plan to have a hysterectomy. Unfortunately she was then diagnosed with a periesophageal hernia and had that repaired which pushed off her hysterectomy.   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   She has abdominal pain now, she cannot tell if it is due to her IBS or more localized to the pelvis. She was also having yearly transvaginal ultrasounds. She has abdominal discomfort in her RLQ. She also reports a recent weight gain of 12-15 pounds.      Family Hx: 2 aunts had breast cancer (one was 37 and  at 39, other was 58)- father will not be tested for BRCA. Younger sister is BRCA negative.      1/10/2019: Pelvic US  IMPRESSION:   1. Single small uterine fibroid.  2. 1.3 cm endometrial polyp in the uterine fundal region. Malignancy  is not entirely excluded in this 53 old female patient. Patient is  already been seen by gynecology oncology service and may benefit from  hysteroscopic evaluation.  3. There are no sonographic signs of invasive endometriosis, noting  ultrasound is limited for this  diagnosis as compared to MRI.  4. Suggestion of uterine adenomyosis.     Review of Systems:  Systemic               no weight changes; no fever; no chills; no night sweats; no appetite changes  Skin               no rashes, or lesions  Eye               no irritation; no changes in vision  Keo-Laryngeal               no dysphagia; no hoarseness   Pulmonary               no cough; no shortness of breath  Cardiovascular               no chest pain; no palpitations  Gastrointestinal               no diarrhea; no constipation; + abdominal pain; no changes in bowel  habits; no blood in stool  Genitourinary              no urinary frequency; no urinary urgency; no dysuria; no pain; no abnormal vaginal discharge; no abnormal vaginal bleeding  Breast   no breast discharge; no breast changes; no breast pain  Musculoskeletal               no myalgias; no arthralgias; no back pain  Psychiatric               no depressed mood; no anxiety    Hematologic           no tender lymph nodes; no noticeable swellings or lumps   Endocrine               no hot flashes; no heat/cold intolerance         Neurological              no tremor; no numbness and tingling; no headaches; no difficulty  sleeping     I have reviewed and addressed the patient's review of symptoms for today's visit.      Past Medical History:  Past Medical History        Past Medical History:   Diagnosis Date     Connective tissue disorder (H)       MASS     GERD (gastroesophageal reflux disease)       Hypothyroid       Mitral valve prolapse       Mitral valve regurgitation       Thyroid disease              Past Surgical History:  LAPAROSCOPY        ORTHOPEDIC SURGERY          SECTION 09       ESOPHAGOGASTRIC FUNDOPLICATION         UPPER GASTROINTESTINAL ENDOSCOPY            Health Maintenance:       Health Maintenance Due   Topic Date Due     DTAP/TDAP/TD IMMUNIZATION (1 - Tdap) 1970     PHQ-2 Q1 YR  1975     HIV SCREEN (SYSTEM ASSIGNED)  " 07/27/1981     HEPATITIS C SCREENING  07/27/1981     PAP SCREENING Q3 YR (SYSTEM ASSIGNED)  07/27/1984     MAMMO SCREEN Q2 YR (SYSTEM ASSIGNED)  07/27/2003     LIPID SCREEN Q5 YR FEMALE (SYSTEM ASSIGNED)  07/27/2008     COLON CANCER SCREEN (SYSTEM ASSIGNED)  07/27/2013     ZOSTER IMMUNIZATION (1 of 2) 07/27/2013     ADVANCE DIRECTIVE PLANNING Q5 YRS  07/27/2018     INFLUENZA VACCINE (1) 09/01/2018         Last Pap Smear: recent and normal per patient report.   She has not had a history of abnormal Pap smears.     Last Mammogram:    2 years ago per pt report     Last Colonoscopy:   Never done                                                    Current Medications:   currently has no medications in their medication list.      Allergies:        Allergies   Allergen Reactions     Valium [Diazepam]        Social History:  Social History      Tobacco Use     Smoking status: Never Smoker   Substance Use Topics     Alcohol use: Yes          History   Drug Use No      Family History:   The patient's family history is notable for:  Family History         Family History   Problem Relation Age of Onset     Prostate Cancer Father       Breast Cancer Paternal Aunt 37     Breast Cancer Paternal Aunt 58            Physical Exam:   Vital signs:  Temp: 98.2  F (36.8  C)   BP: (!) 143/94 Pulse: 107   Resp: 20 SpO2: 97 % O2 Device: None (Room air)   Height: 177 cm (5' 9.69\") Weight: 83.6 kg (184 lb 4.9 oz)  Estimated body mass index is 26.68 kg/m  as calculated from the following:    Height as of this encounter: 1.77 m (5' 9.69\").    Weight as of this encounter: 83.6 kg (184 lb 4.9 oz).       General Appearance:  healthy and alert, no distress     HEENT:                       no thyromegaly, no palpable nodules or masses                                         Cardiovascular:           regular rate and rhythm, no gallops, rubs or murmurs                Respiratory:     lungs clear, no rales, rhonchi or wheezes, normal diaphragmatic " excursion     Musculoskeletal:         extremities non tender and without edema     Skin:    no lesions or rashes      Neurological:   normal gait, no gross defects                Psychiatric:      appropriate mood and affect                               Hematological:            normal cervical, supraclavicular and inguinal lymph nodes                Gastrointestinal:          abdomen soft, non-tender, non-distended, no organomegaly or masses     Genitourinary: deferred        Assessment:     Felix Alvarado is a 53 year old woman who presents today for D and C hysteroscopy with myosure due to lower pelvic pain, history of endometriosis and fibroids.   Pap NIL with endometrial cells present     Plan:     1.)   Will plan for D and C hysteroscopy with myosure under US guidance today.  Risks including infection, bleeding, damage to surrounding organs as well as benefits, and alternatives were explained in detail to the patient who elicited understanding.          Thank you for allowing us to participate in the care of your patient.        Sincerely,     Rosaura Sosa MD    Department of Ob/Gyn and Women's Health  Division of Gynecologic Oncology  [facility]  [phone]

## 2019-04-08 NOTE — ANESTHESIA POSTPROCEDURE EVALUATION
Anesthesia POST Procedure Evaluation    Patient: Felix Alvarado   MRN:     [id number] Gender:   female   Age:    53 year old :      1965        Preoperative Diagnosis: Endometrial Polyp   Procedure(s):  Dilation And Curettage Under Ultrasound Guidance  Operative Hysteroscopy With Myosure   Postop Comments: No value filed.       Anesthesia Type:  General    Reportable Event: NO     PAIN: Uncomplicated   Sign Out status: Comfortable, Well controlled pain     PONV: No PONV   Sign Out status:  No Nausea or Vomiting     Neuro/Psych: Uneventful perioperative course   Sign Out Status: Preoperative baseline; Age appropriate mentation     Airway/Resp.: Uneventful perioperative course   Sign Out Status: Non labored breathing, age appropriate RR; Resp. Status within EXPECTED Parameters     CV: Uneventful perioperative course   Sign Out status: Appropriate BP and perfusion indices; Appropriate HR/Rhythm     Disposition:   Sign Out in:  PACU  Disposition:  Phase II; Home  Recovery Course: Uneventful  Follow-Up: Not required           Last Anesthesia Record Vitals:  CRNA VITALS  2019 1350 - 2019 1436      2019             NIBP:  152/86    Pulse:  100    SpO2:  100 %          Last PACU Vitals:  Vitals Value Taken Time   /86 2019  2:22 PM   Temp     Pulse 101 2019  2:22 PM   Resp     SpO2 100 % 2019  2:35 PM   Temp src     NIBP 152/86 2019  2:25 PM   Pulse 100 2019  2:25 PM   SpO2 100 % 2019  2:25 PM   Resp     Temp     Ht Rate     Temp 2     Vitals shown include unvalidated device data.      Electronically Signed By: Jazmine Paul MD, 2019, 2:36 PM

## 2019-04-09 LAB — INTERPRETATION ECG - MUSE: NORMAL

## 2019-04-10 LAB — COPATH REPORT: NORMAL

## 2019-04-11 ENCOUNTER — CARE COORDINATION (OUTPATIENT)
Dept: ONCOLOGY | Facility: CLINIC | Age: 54
End: 2019-04-11

## 2019-04-11 NOTE — PROGRESS NOTES
Surgery date: 4/8  Post op visit:  4/25    Spoke with pt states feeling pretty good  Pain   Some cramping   Meds:  ibuprofen   Well controlled:  yes  Denies fever, chills, bowel/bladder issues, leg redness/swelling/tenderness, chest pain, SOB  Eating and drinking well, denies nausea/vomiting  Having some irritation with urination, encouraged to push fluids reassured this is likely due to surgery and recovery  Pt will call if notes frequency urgency or dysuria  Incision    Healthy: no signs of infection,     Redness or drainage:  no   Shower:  yes  Staples:  na  Walking:  yes  Questions:      Post op appt confirmed and patient will call if any questions or concerns

## 2019-05-14 DIAGNOSIS — Z80.3 FAMILY HISTORY OF MALIGNANT NEOPLASM OF BREAST: Primary | ICD-10-CM

## 2020-10-19 NOTE — TELEPHONE ENCOUNTER
Records received from [facility] and sent to [facility]   To confirm, Your doctor has instructed you that surgery is scheduled for: 11/2/20 JANE  [phone]    Please report to [facility], [facility] the morning of surgery. You must check-in and receive a wristband before going to your procedure.    Pre-Op will call the afternoon prior to surgery between 1:00 and 6:00 PM with the final arrival time.  Phone number: [phone]    PLEASE NOTE:  The surgery schedule has many variables which may affect the time of your surgery case.  Family members should be available if your surgery time changes.  Plan to be here the day of your procedure between 4-6 hours.    MEDICATIONS:  TAKE ONLY THESE MEDICATIONS WITH A SMALL SIP OF WATER THE MORNING OF YOUR PROCEDURE:    TYLENOL, THYROID, ALIGN, FLONASE, METOPROLOL, OMEPRAZOLE, RHTHYMOL, ZOLOFT.    DO NOT TAKE THESE MEDICATIONS 5-7 DAYS PRIOR to your procedure or per your surgeon's request: ASPIRIN, ALEVE, ADVIL, IBUPROFEN, FISH OIL VITAMIN E, HERBALS - COQ-10, KRILL OIL, A-REDS  LAST DOSE 10/25/20  (May take Tylenol)    ONLY if you are prescribed any types of blood thinners such as:  Aspirin, Coumadin, Plavix, Pradaxa, Xarelto, Aggrenox, Effient, Eliquis, Savasya, Brilinta, or any other, ask your surgeon whether you should stop taking them and how long before surgery you should stop.  You may also need to verify with the prescribing physician if it is ok to stop your medication.      INSTRUCTIONS IMPORTANT!!  · Do not eat or drink anything between midnight and the time of your procedure- this includes gum, mints, and candy.  · Do not smoke or drink alcoholic beverages 24 hours prior to your procedure.  · Shower the night before AND the morning of your procedure with a Chlorhexidine wash such as Hibiclens or Dial antibacterial soap from the neck down.  Do not get it on your face or in your eyes.  You may use your own shampoo and face wash. This helps your skin to be as bacteria free as possible.    · If you  wear contact lenses, dentures, hearing aids or glasses, bring a container to put them in during surgery and give to a family member for safe keeping.  Please leave all jewelry, piercing's and valuables at home.   · DO NOT remove hair from the surgery site.  Do not shave the incision site unless you are given specific instructions to do so.    · ONLY if you have been diagnosed with sleep apnea please bring your C-PAP machine.  · ONLY if you wear home oxygen please bring your portable oxygen tank the day of your procedure.  · ONLY if you have a history of OPEN HEART SURGERY you will need a clearance from your Cardiologist per Anesthesia.      · ONLY for patients requiring bowel prep, written instructions will be given by your doctor's office.  · ONLY if you have a neuro stimulator, please bring the controller with you the morning of surgery  · ONLY if a type and screen test is needed before surgery, please return:  · If your doctor has scheduled you for an overnight stay, bring a small overnight bag with any personal items you need.  · Make arrangements in advance for transportation home by a responsible adult.  It is not safe to drive a vehicle during the 24 hours after anesthesia.     · ONLY ONE VISITOR PER PATIENT IS ALLOWED TO COME IN THE HOSPITAL THE DAY OF PROCEDURE.   · Visiting hours are 8:00AM-6:00PM. For the safety of all patients, visitors under the age of 12 are not allowed above the first floor of the hospital.    · All [facility] facilities and properties are tobacco free.  Smoking is NOT allowed.       If you have any questions about these instructions, call Pre-Op Admit  Nursing at [phone] or the Pre-Op Day Surgery Unit at [phone].

## (undated) DEVICE — PAD CHUX UNDERPAD 30X36" P3036C

## (undated) DEVICE — SOL NACL 0.9% IRRIG 1000ML BOTTLE 2F7124

## (undated) DEVICE — GLOVE PROTEXIS W/NEU-THERA 6.5  2D73TE65

## (undated) DEVICE — GLOVE PROTEXIS BLUE W/NEU-THERA 7.0  2D73EB70

## (undated) DEVICE — Device

## (undated) DEVICE — SPECIMEN BAG BEMIS HI FLOW SUCTION WHITE SOCK 533810

## (undated) DEVICE — SEAL SET MYOSURE ROD LENS SCOPE SINGLE USE 40-902

## (undated) DEVICE — PACK GOWN 3/PK DISP XL SBA32GPFCB

## (undated) DEVICE — LINEN TOWEL PACK X5 5464

## (undated) DEVICE — SOL NACL 0.9% IRRIG 3000ML BAG 2B7477

## (undated) DEVICE — SUCTION CANISTER BEMIS HI FLOW 006772-901

## (undated) DEVICE — TUBING SYS AQUILEX BLUE INFLOW AQL-110 YLW OUTFLOW AQL-111

## (undated) RX ORDER — BUPIVACAINE HYDROCHLORIDE 5 MG/ML
INJECTION, SOLUTION EPIDURAL; INTRACAUDAL
Status: DISPENSED
Start: 2019-04-08

## (undated) RX ORDER — GABAPENTIN 300 MG/1
CAPSULE ORAL
Status: DISPENSED
Start: 2019-04-08

## (undated) RX ORDER — ESMOLOL HYDROCHLORIDE 10 MG/ML
INJECTION INTRAVENOUS
Status: DISPENSED
Start: 2019-04-08

## (undated) RX ORDER — GABAPENTIN 100 MG/1
CAPSULE ORAL
Status: DISPENSED
Start: 2019-04-08

## (undated) RX ORDER — GRANISETRON HYDROCHLORIDE 1 MG/1
TABLET, FILM COATED ORAL
Status: DISPENSED
Start: 2019-04-08

## (undated) RX ORDER — FENTANYL CITRATE 50 UG/ML
INJECTION, SOLUTION INTRAMUSCULAR; INTRAVENOUS
Status: DISPENSED
Start: 2019-04-08

## (undated) RX ORDER — TRAMADOL HYDROCHLORIDE 50 MG/1
TABLET ORAL
Status: DISPENSED
Start: 2019-04-08

## (undated) RX ORDER — ACETAMINOPHEN 325 MG/1
TABLET ORAL
Status: DISPENSED
Start: 2019-04-08

## (undated) RX ORDER — KETOROLAC TROMETHAMINE 30 MG/ML
INJECTION, SOLUTION INTRAMUSCULAR; INTRAVENOUS
Status: DISPENSED
Start: 2019-04-08